# Patient Record
(demographics unavailable — no encounter records)

---

## 2025-06-09 NOTE — HISTORY OF PRESENT ILLNESS
[Former] : former [TextBox_4] : 06/09/2025: Asked to evaluate patient by Dr Vyas for wheezing; saw Dr Condon once in 2021. She comes in with her son Silviano. The patient's daughters say she wheezes. No hx asthma. Quit smoking around 40. She worked in a [employer] [address], does not get out and walk. She's not dyspneic at all. No allergies. Her son is not worried about her. She is not worried. Her daughter made her son bring her today. Not going to take any medicines. Has been given a pump before and didn't use it. Son says "she's lived a long life" and patient says "not long enough!"

## 2025-06-09 NOTE — PHYSICAL EXAM
[No Acute Distress] : no acute distress [Normal Rate/Rhythm] : normal rate/rhythm [Normal S1, S2] : normal s1, s2 [Clear to Auscultation Bilaterally] : clear to auscultation bilaterally [No Resp Distress] : no resp distress [TextBox_44] : no stridor

## 2025-06-09 NOTE — ASSESSMENT
[FreeTextEntry1] : Data reviewed:  Eos 290/ul (6/2021)  Lonnie 06/09/2025 : normal for age, FEV1 84% / FENO 51  Impression: Wheezing heard by family members  Plan: We talked to her daughter Geogria on the phone who reports that she and other family members have heard the patient wheezing, mostly with exertion, but who also says the patient has reported hearing wheezing at night. The patient herself denies wheezing or dyspnea and she is not wheezing today. She has normal lung function, and she's been offered inhalers before and didn't take them. I am not sure she has any disease and if she does it does not seem severe. Will do nothing here, just observe. Patient is happy with this plan. My door is open to her and she and her children know she can come back for any new concerns or symptoms. Counseled in detail as to vaccines - son will check w his sisters to make sure she's UTD.

## 2025-06-09 NOTE — ASSESSMENT
[FreeTextEntry1] : Data reviewed:  Eos 290/ul (6/2021)  Lonnie 06/09/2025 : normal for age, FEV1 84% / FENO 51  Impression: Wheezing heard by family members  Plan: We talked to her daughter Georgia on the phone who reports that she and other family members have heard the patient wheezing, mostly with exertion, but who also says the patient has reported hearing wheezing at night. The patient herself denies wheezing or dyspnea and she is not wheezing today. She has normal lung function, and she's been offered inhalers before and didn't take them. I am not sure she has any disease and if she does it does not seem severe. Will do nothing here, just observe. Patient is happy with this plan. My door is open to her and she and her children know she can come back for any new concerns or symptoms. Counseled in detail as to vaccines - son will check w his sisters to make sure she's UTD.

## 2025-06-09 NOTE — PHYSICAL EXAM
[No Acute Distress] : no acute distress [Normal Rate/Rhythm] : normal rate/rhythm [Normal S1, S2] : normal s1, s2 [No Resp Distress] : no resp distress [Clear to Auscultation Bilaterally] : clear to auscultation bilaterally [TextBox_44] : no stridor

## 2025-06-09 NOTE — HISTORY OF PRESENT ILLNESS
[Former] : former [TextBox_4] : 06/09/2025: Asked to evaluate patient by Dr Vyas for wheezing; saw Dr Condon once in 2021. She comes in with her son Silviano. The patient's daughters say she wheezes. No hx asthma. Quit smoking around 40. She worked in a [employer]. Lives [address], does not get out and walk. She's not dyspneic at all. No allergies. Her son is not worried about her. She is not worried. Her daughter made her son bring her today. Not going to take any medicines. Has been given a pump before and didn't use it. Son says "she's lived a long life" and patient says "not long enough!"

## 2025-06-10 NOTE — HISTORY OF PRESENT ILLNESS
[FreeTextEntry1] : 88 year old woman, former smoker 35 pack years, history of OA, HTN, HLD who had presented with mild bilateral TITA and joint pain particularly in her back and hands but states she is feeling healthy overall. Her children are concerned that patient is less active, noted to be more dyspneic and also has been more forgetful recently. Patient specifically denies syncope, lightheadedness, chest pain, palpitations, orthopnea, or PND. She denies any falls or orthostatic symptoms. Of note, she was found with severe Vit B12 deficiency at last visit with me and was started on B12 supplementation. Her PCP Dr. Vyas was informed to assist with continued management and follow-up. Since then, patient has been in her usual state of health, with no new complaints. Denies lightheadedness and orthostatic symptoms.   Since last visit, she feels overall well. She will walk 4 blocks at moderate pace without symptoms, not needing to stop. She does not use cane or other assistance to walk. Can climb 1 to 2 flights of stairs without stopping, taking time. Eats "junk food" like candy and cookies and cake. Denies chest pain, dyspnea, palpitations, orthopnea, PND or TITA. Patient not taking any medications and does not check BP at home. Daughter who joined visit on phone is concerned that patient appears dyspneic with exertion.

## 2025-06-10 NOTE — PHYSICAL EXAM
[No Acute Distress] : no acute distress [Normal S1, S2] : normal S1, S2 [No Rub] : no rub [No Murmur] : no murmur [No Gallop] : no gallop [de-identified] : Soft breath sounds [de-identified] : +kyphosis [de-identified] : Trace bilateral pitting edema

## 2025-06-10 NOTE — ASSESSMENT
[FreeTextEntry1] : 88 year old woman, former smoker 35 pack years, history of OA, HTN, HLD who presents to establish care, found with vit B12 deficiency.   #Dyspnea: Diminished breath sounds bilaterally, suspect chiefly due to emphysema and deconditioning. - PFTs showed moderately decreased defusion capacity and restrictive disease, patient will follow-up with Pulm - TTE pending  #HTN: /80 today, similar repeated on both arms. Review of chart shows generally >140s/80s. BP and TITA improved, still above goal and patient with no symptoms of orthostasis or hypotension.  - HCTZ was prescribed however patient not taking, patient would like to avoid medications and is reluctant to restart, goals of care initiated with daughter, patient and son and they will continue to discuss as family  #HLD: Patient was previously taking statin but has stopped as she wants to minimize medications. We discussed benefit-risks and side effects for LDL lowering and given patient's advanced age, likely no meaningful life-prolonging benefit for primary prevention.   #MCI, Vit B12 deficiency: May be cause of cognitive slowing and memory loss as well as impaired balance. Macrocytosis with normal Hgb. Likely due to impaired absorption or inadequate intake, will defer to PCP for work-up of myelodysplasia or other causes. Supranormal B12 levels on 2 week repeat.